# Patient Record
Sex: FEMALE | Race: WHITE | NOT HISPANIC OR LATINO | Employment: UNEMPLOYED
[De-identification: names, ages, dates, MRNs, and addresses within clinical notes are randomized per-mention and may not be internally consistent; named-entity substitution may affect disease eponyms.]

---

## 2017-10-29 ENCOUNTER — HEALTH MAINTENANCE LETTER (OUTPATIENT)
Age: 37
End: 2017-10-29

## 2018-12-10 ENCOUNTER — HOSPITAL ENCOUNTER (OUTPATIENT)
Dept: MAMMOGRAPHY | Facility: CLINIC | Age: 38
Discharge: HOME | End: 2018-12-10
Attending: INTERNAL MEDICINE | Admitting: INTERNAL MEDICINE
Payer: COMMERCIAL

## 2018-12-10 DIAGNOSIS — Z12.31 VISIT FOR SCREENING MAMMOGRAM: ICD-10-CM

## 2018-12-10 PROCEDURE — 77063 BREAST TOMOSYNTHESIS BI: CPT

## 2019-12-10 ENCOUNTER — HOSPITAL ENCOUNTER (OUTPATIENT)
Dept: MAMMOGRAPHY | Facility: CLINIC | Age: 39
Discharge: HOME | End: 2019-12-10
Attending: INTERNAL MEDICINE | Admitting: INTERNAL MEDICINE
Payer: COMMERCIAL

## 2019-12-10 DIAGNOSIS — Z12.31 SCREENING MAMMOGRAM, ENCOUNTER FOR: ICD-10-CM

## 2019-12-10 PROCEDURE — 77063 BREAST TOMOSYNTHESIS BI: CPT

## 2020-12-29 ENCOUNTER — TRANSFERRED RECORDS (OUTPATIENT)
Dept: HEALTH INFORMATION MANAGEMENT | Facility: CLINIC | Age: 40
End: 2020-12-29

## 2020-12-31 ENCOUNTER — TRANSFERRED RECORDS (OUTPATIENT)
Dept: HEALTH INFORMATION MANAGEMENT | Facility: CLINIC | Age: 40
End: 2020-12-31

## 2021-01-04 ENCOUNTER — TRANSFERRED RECORDS (OUTPATIENT)
Dept: HEALTH INFORMATION MANAGEMENT | Facility: CLINIC | Age: 41
End: 2021-01-04

## 2021-01-29 ENCOUNTER — HOSPITAL ENCOUNTER (OUTPATIENT)
Dept: MAMMOGRAPHY | Facility: CLINIC | Age: 41
Discharge: HOME | End: 2021-01-29
Attending: INTERNAL MEDICINE | Admitting: INTERNAL MEDICINE
Payer: COMMERCIAL

## 2021-01-29 DIAGNOSIS — Z12.31 VISIT FOR SCREENING MAMMOGRAM: ICD-10-CM

## 2021-01-29 PROCEDURE — 77063 BREAST TOMOSYNTHESIS BI: CPT

## 2021-02-16 ENCOUNTER — OFFICE VISIT (OUTPATIENT)
Dept: CARDIOLOGY | Facility: CLINIC | Age: 41
End: 2021-02-16
Payer: COMMERCIAL

## 2021-02-16 VITALS
HEIGHT: 64 IN | SYSTOLIC BLOOD PRESSURE: 119 MMHG | BODY MASS INDEX: 24.92 KG/M2 | DIASTOLIC BLOOD PRESSURE: 80 MMHG | WEIGHT: 146 LBS | HEART RATE: 70 BPM

## 2021-02-16 DIAGNOSIS — R00.2 PALPITATIONS: Primary | ICD-10-CM

## 2021-02-16 PROCEDURE — 99203 OFFICE O/P NEW LOW 30 MIN: CPT | Performed by: INTERNAL MEDICINE

## 2021-02-16 PROCEDURE — 93000 ELECTROCARDIOGRAM COMPLETE: CPT | Performed by: INTERNAL MEDICINE

## 2021-02-16 RX ORDER — METOPROLOL TARTRATE 25 MG/1
TABLET, FILM COATED ORAL
Qty: 6 TABLET | Refills: 1 | Status: SHIPPED | OUTPATIENT
Start: 2021-02-16 | End: 2022-03-29

## 2021-02-16 ASSESSMENT — MIFFLIN-ST. JEOR: SCORE: 1302.25

## 2021-02-16 NOTE — LETTER
2/16/2021    Beverley Vanegas  [address] [facility] [address]    RE: Jyothi Landeros       Dear Colleague,    I had the pleasure of seeing Jyothi Landeros in the [facility].    HPI and Plan:   See dictation    Orders Placed This Encounter   Procedures     Follow-Up with Cardiologist     EKG 12-lead complete w/read - Clinics (performed today)     Holter Monitor 48 hour Adult Pediatric     No orders of the defined types were placed in this encounter.    There are no discontinued medications.      Encounter Diagnosis   Name Primary?     Palpitations Yes       CURRENT MEDICATIONS:  Current Outpatient Medications   Medication Sig Dispense Refill     IUD'S IU by Intrauterine route.         ALLERGIES   No Known Allergies    PAST MEDICAL HISTORY:  Past Medical History:   Diagnosis Date     Cardiac arrhythmia, unspecified cardiac arrhythmia type     PVC, rare PAC, AVnode wenckebache(mobitz type 1)       PAST SURGICAL HISTORY:  Past Surgical History:   Procedure Laterality Date     CONTRACEPT IUD         FAMILY HISTORY:  Family History   Problem Relation Age of Onset     Heart Disease Maternal Grandmother      Colon Polyps Mother         hyper PTH     Family History Negative No family hx of        SOCIAL HISTORY:  Social History     Socioeconomic History     Marital status:      Spouse name: None     Number of children: None     Years of education: None     Highest education level: None   Occupational History     None   Social Needs     Financial resource strain: None     Food insecurity     Worry: None     Inability: None     Transportation needs     Medical: None     Non-medical: None   Tobacco Use     Smoking status: Never Smoker     Smokeless tobacco: Never Used   Substance and Sexual Activity     Alcohol use: Yes     Comment: rarely     Drug use: No     Comment: caffeine 0,  some dark chocolate4     Sexual activity: Yes     Partners: Male   Lifestyle  "    Physical activity     Days per week: None     Minutes per session: None     Stress: None   Relationships     Social connections     Talks on phone: None     Gets together: None     Attends Restorationism service: None     Active member of club or organization: None     Attends meetings of clubs or organizations: None     Relationship status: None     Intimate partner violence     Fear of current or ex partner: None     Emotionally abused: None     Physically abused: None     Forced sexual activity: None   Other Topics Concern     Parent/sibling w/ CABG, MI or angioplasty before 65F 55M? Not Asked   Social History Narrative     None       Review of Systems:  Skin:  Negative     Eyes:  Positive for glasses;contacts  ENT:  Negative    Respiratory:  Negative    Cardiovascular:    palpitations;Positive for  Gastroenterology: Negative    Genitourinary:  Negative    Musculoskeletal:  Negative    Neurologic:  Positive for migraine headaches  Psychiatric:  Positive for sleep disturbances  Heme/Lymph/Imm:  Negative    Endocrine:  Negative      Physical Exam:  Vitals: /80   Pulse 70   Ht 1.626 m (5' 4\")   Wt 66.2 kg (146 lb)   BMI 25.06 kg/m      Constitutional:  cooperative, alert and oriented, well developed, well nourished, in no acute distress        Skin:  warm and dry to the touch, no apparent skin lesions or masses noted          Head:  normocephalic, no masses or lesions        Eyes:  pupils equal and round, conjunctivae and lids unremarkable, sclera white, no xanthalasma, EOMS intact, no nystagmus        Lymph:No Cervical lymphadenopathy present;No thyromegaly     ENT:  no pallor or cyanosis, dentition good        Neck:  carotid pulses are full and equal bilaterally, JVP normal, no carotid bruit        Respiratory:  normal breath sounds, clear to auscultation, normal A-P diameter, normal symmetry, normal respiratory excursion, no use of accessory muscles         Cardiac: regular rhythm, normal S1/S2, no S3 or " S4, apical impulse not displaced, no murmurs, gallops or rubs       systolic murmur        pulses full and equal, no bruits auscultated                                        GI:  abdomen soft, non-tender, BS normoactive, no mass, no HSM, no bruits        Extremities and Muscular Skeletal:  no deformities, clubbing, cyanosis, erythema observed;no edema              Neurological:  no gross motor deficits        Psych:  Alert and Oriented x 3      Recent Lab Results:  LIPID RESULTS:  No results found for: CHOL, HDL, LDL, TRIG, CHOLHDLRATIO    LIVER ENZYME RESULTS:  No results found for: AST, ALT    CBC RESULTS:  No results found for: WBC, RBC, HGB, HCT, MCV, MCH, MCHC, RDW, PLT    BMP RESULTS:  No results found for: NA, POTASSIUM, CHLORIDE, CO2, ANIONGAP, GLC, BUN, CR, GFRESTIMATED, GFRESTBLACK, MONAE     A1C RESULTS:  No results found for: A1C    INR RESULTS:  No results found for: INR        CC  No referring provider defined for this encounter.      Thank you for allowing me to participate in the care of your patient.      Sincerely,     Dre Vann MD     [facility]  cc:   No referring provider defined for this encounter.

## 2021-02-16 NOTE — PROGRESS NOTES
HPI and Plan:   See dictation    Orders Placed This Encounter   Procedures     Follow-Up with Cardiologist     EKG 12-lead complete w/read - Clinics (performed today)     Holter Monitor 48 hour Adult Pediatric     No orders of the defined types were placed in this encounter.    There are no discontinued medications.      Encounter Diagnosis   Name Primary?     Palpitations Yes       CURRENT MEDICATIONS:  Current Outpatient Medications   Medication Sig Dispense Refill     IUD'S IU by Intrauterine route.         ALLERGIES   No Known Allergies    PAST MEDICAL HISTORY:  Past Medical History:   Diagnosis Date     Cardiac arrhythmia, unspecified cardiac arrhythmia type     PVC, rare PAC, AVnode wenckebache(mobitz type 1)       PAST SURGICAL HISTORY:  Past Surgical History:   Procedure Laterality Date     CONTRACEPT IUD         FAMILY HISTORY:  Family History   Problem Relation Age of Onset     Heart Disease Maternal Grandmother      Colon Polyps Mother         hyper PTH     Family History Negative No family hx of        SOCIAL HISTORY:  Social History     Socioeconomic History     Marital status:      Spouse name: None     Number of children: None     Years of education: None     Highest education level: None   Occupational History     None   Social Needs     Financial resource strain: None     Food insecurity     Worry: None     Inability: None     Transportation needs     Medical: None     Non-medical: None   Tobacco Use     Smoking status: Never Smoker     Smokeless tobacco: Never Used   Substance and Sexual Activity     Alcohol use: Yes     Comment: rarely     Drug use: No     Comment: caffeine 0,  some dark chocolate4     Sexual activity: Yes     Partners: Male   Lifestyle     Physical activity     Days per week: None     Minutes per session: None     Stress: None   Relationships     Social connections     Talks on phone: None     Gets together: None     Attends Islam service: None     Active member of  "club or organization: None     Attends meetings of clubs or organizations: None     Relationship status: None     Intimate partner violence     Fear of current or ex partner: None     Emotionally abused: None     Physically abused: None     Forced sexual activity: None   Other Topics Concern     Parent/sibling w/ CABG, MI or angioplasty before 65F 55M? Not Asked   Social History Narrative     None       Review of Systems:  Skin:  Negative     Eyes:  Positive for glasses;contacts  ENT:  Negative    Respiratory:  Negative    Cardiovascular:    palpitations;Positive for  Gastroenterology: Negative    Genitourinary:  Negative    Musculoskeletal:  Negative    Neurologic:  Positive for migraine headaches  Psychiatric:  Positive for sleep disturbances  Heme/Lymph/Imm:  Negative    Endocrine:  Negative      Physical Exam:  Vitals: /80   Pulse 70   Ht 1.626 m (5' 4\")   Wt 66.2 kg (146 lb)   BMI 25.06 kg/m      Constitutional:  cooperative, alert and oriented, well developed, well nourished, in no acute distress        Skin:  warm and dry to the touch, no apparent skin lesions or masses noted          Head:  normocephalic, no masses or lesions        Eyes:  pupils equal and round, conjunctivae and lids unremarkable, sclera white, no xanthalasma, EOMS intact, no nystagmus        Lymph:No Cervical lymphadenopathy present;No thyromegaly     ENT:  no pallor or cyanosis, dentition good        Neck:  carotid pulses are full and equal bilaterally, JVP normal, no carotid bruit        Respiratory:  normal breath sounds, clear to auscultation, normal A-P diameter, normal symmetry, normal respiratory excursion, no use of accessory muscles         Cardiac: regular rhythm, normal S1/S2, no S3 or S4, apical impulse not displaced, no murmurs, gallops or rubs       systolic murmur        pulses full and equal, no bruits auscultated                                        GI:  abdomen soft, non-tender, BS normoactive, no mass, no " HSM, no bruits        Extremities and Muscular Skeletal:  no deformities, clubbing, cyanosis, erythema observed;no edema              Neurological:  no gross motor deficits        Psych:  Alert and Oriented x 3      Recent Lab Results:  LIPID RESULTS:  No results found for: CHOL, HDL, LDL, TRIG, CHOLHDLRATIO    LIVER ENZYME RESULTS:  No results found for: AST, ALT    CBC RESULTS:  No results found for: WBC, RBC, HGB, HCT, MCV, MCH, MCHC, RDW, PLT    BMP RESULTS:  No results found for: NA, POTASSIUM, CHLORIDE, CO2, ANIONGAP, GLC, BUN, CR, GFRESTIMATED, GFRESTBLACK, MONAE     A1C RESULTS:  No results found for: A1C    INR RESULTS:  No results found for: INR        CC  No referring provider defined for this encounter.

## 2021-02-16 NOTE — LETTER
2/16/2021      Beverley Vanegas  [address] [facility] [address]      RE: Jyothi Landeros       Dear Colleague,    I had the pleasure of seeing Jyothi Landeros in the [facility].    Service Date: 02/16/2021      Jyothi Landeros is a delightful, very fit 41-year-old woman.  She was referred in actually by my cousin, Isabella, and Dr. Vanegas for palpitations.  I reviewed the workup Dr. Vanegas did and I want to thank her in advance.  She really did a very complete workup.  The patient is having isolated flip flops, usually at rest in a quiet environment, not with exercise.  She has no exercise intolerance, no chest pain, no dizziness, no syncope.  The patient has a Zio Patch that shows PACs rarely, PVCs which she was symptomatic and also Mobitz I type, usually with low heart rates and sometimes at night.  The patient's echocardiogram showed mild MR.  Otherwise, it was a completely normal echo.  Electrolytes, TSH, etc., are all normal.      Today's visit was an hour.  We were very complete.  We discussed all of this, PACs, PVCs.  We explained the heart's electrical system.  She is already off caffeine, but she did not realize that dark chocolate has theobromine which is a caffeine-type analog and so she will watch that.  I told her these are actually very benign and some people are bothered by them.  I told her we can give her a low-dose beta blocker on a p.r.n. basis.  I explained, though, we have to be careful since she runs a low heart rate and she had Wenckebach, but in case she was having a very bad day, we told her she could take 25 or 50 mg of Lopressor.  I reviewed all of her questions.  She had a number of very appropriate questions about what she can do or not do.  I really told her that there are no limitations here.  I will see her back in 1 year.  I plan on doing a 48-hour Holter to watch for heart block, etc.      Today's visit then was 1 hour,  greater than 50% counseling.  We will see the patient back in 1 year for a routine office visit.  She may need a repeat echo in 3-5 years to look at the mitral insufficiency.      cc:   Beverley Vanegas MD   [address]  [address]         DRE UAMNA MD             D: 2021   T: 2021   MT: billy      Name:     AIDEN MAHARAJ   MRN:      [id number]        Account:      [id number]   :      1980           Service Date: 2021      Document: [id number]        Outpatient Encounter Medications as of 2021   Medication Sig Dispense Refill     metoprolol tartrate (LOPRESSOR) 25 MG tablet 1 or 2 tabs prn palpitations 6 tablet 1     IUD'S IU by Intrauterine route.       No facility-administered encounter medications on file as of 2021.        Again, thank you for allowing me to participate in the care of your patient.      Sincerely,    Dre Umana MD     [facility]

## 2021-02-16 NOTE — PROGRESS NOTES
Service Date: 02/16/2021      Jyothi Landeros is a delightful, very fit 41-year-old woman.  She was referred in actually by my cousin, Isabella, and Dr. Vanegas for palpitations.  I reviewed the workup Dr. Vanegas did and I want to thank her in advance.  She really did a very complete workup.  The patient is having isolated flip flops, usually at rest in a quiet environment, not with exercise.  She has no exercise intolerance, no chest pain, no dizziness, no syncope.  The patient has a Zio Patch that shows PACs rarely, PVCs which she was symptomatic and also Mobitz I type, usually with low heart rates and sometimes at night.  The patient's echocardiogram showed mild MR.  Otherwise, it was a completely normal echo.  Electrolytes, TSH, etc., are all normal.      Today's visit was an hour.  We were very complete.  We discussed all of this, PACs, PVCs.  We explained the heart's electrical system.  She is already off caffeine, but she did not realize that dark chocolate has theobromine which is a caffeine-type analog and so she will watch that.  I told her these are actually very benign and some people are bothered by them.  I told her we can give her a low-dose beta blocker on a p.r.n. basis.  I explained, though, we have to be careful since she runs a low heart rate and she had Wenckebach, but in case she was having a very bad day, we told her she could take 25 or 50 mg of Lopressor.  I reviewed all of her questions.  She had a number of very appropriate questions about what she can do or not do.  I really told her that there are no limitations here.  I will see her back in 1 year.  I plan on doing a 48-hour Holter to watch for heart block, etc.      Today's visit then was 1 hour, greater than 50% counseling.  We will see the patient back in 1 year for a routine office visit.  She may need a repeat echo in 3-5 years to look at the mitral insufficiency.      cc:   Beverley Vanegas MD   [facility]   [address]  [place]  [address]         CAROLYN UMANA MD             D: 2021   T: 2021   MT: billy      Name:     AIDEN MAHARAJ   MRN:      [id number]        Account:      [id number]   :      1980           Service Date: 2021      Document: [id number]

## 2021-05-28 ENCOUNTER — APPOINTMENT (OUTPATIENT)
Dept: URBAN - METROPOLITAN AREA CLINIC 256 | Age: 41
Setting detail: DERMATOLOGY
End: 2021-05-28

## 2021-06-18 ENCOUNTER — APPOINTMENT (OUTPATIENT)
Dept: URBAN - METROPOLITAN AREA CLINIC 256 | Age: 41
Setting detail: DERMATOLOGY
End: 2021-06-18

## 2021-08-07 ENCOUNTER — HEALTH MAINTENANCE LETTER (OUTPATIENT)
Age: 41
End: 2021-08-07

## 2021-10-02 ENCOUNTER — HEALTH MAINTENANCE LETTER (OUTPATIENT)
Age: 41
End: 2021-10-02

## 2022-02-03 ENCOUNTER — HOSPITAL ENCOUNTER (OUTPATIENT)
Dept: MAMMOGRAPHY | Facility: CLINIC | Age: 42
Discharge: HOME | End: 2022-02-03
Attending: INTERNAL MEDICINE | Admitting: INTERNAL MEDICINE
Payer: COMMERCIAL

## 2022-02-03 DIAGNOSIS — Z12.31 VISIT FOR SCREENING MAMMOGRAM: ICD-10-CM

## 2022-02-03 PROCEDURE — 77067 SCR MAMMO BI INCL CAD: CPT

## 2022-03-21 ENCOUNTER — HOSPITAL ENCOUNTER (OUTPATIENT)
Dept: CARDIOLOGY | Facility: CLINIC | Age: 42
Discharge: HOME | End: 2022-03-21
Attending: INTERNAL MEDICINE | Admitting: INTERNAL MEDICINE
Payer: COMMERCIAL

## 2022-03-21 DIAGNOSIS — R00.2 PALPITATIONS: ICD-10-CM

## 2022-03-21 PROCEDURE — 93227 XTRNL ECG REC<48 HR R&I: CPT | Performed by: INTERNAL MEDICINE

## 2022-03-21 PROCEDURE — 93225 XTRNL ECG REC<48 HRS REC: CPT

## 2022-03-29 ENCOUNTER — OFFICE VISIT (OUTPATIENT)
Dept: CARDIOLOGY | Facility: CLINIC | Age: 42
End: 2022-03-29
Payer: COMMERCIAL

## 2022-03-29 VITALS
HEIGHT: 64 IN | DIASTOLIC BLOOD PRESSURE: 68 MMHG | HEART RATE: 76 BPM | OXYGEN SATURATION: 98 % | SYSTOLIC BLOOD PRESSURE: 100 MMHG | WEIGHT: 148 LBS | BODY MASS INDEX: 25.27 KG/M2

## 2022-03-29 DIAGNOSIS — I49.8 OTHER CARDIAC ARRHYTHMIA: Primary | ICD-10-CM

## 2022-03-29 PROCEDURE — 99213 OFFICE O/P EST LOW 20 MIN: CPT | Performed by: INTERNAL MEDICINE

## 2022-03-29 RX ORDER — MAGNESIUM OXIDE/MAG AA CHELATE 300 MG
300 CAPSULE ORAL DAILY PRN
COMMUNITY
Start: 2021-12-13

## 2022-03-29 NOTE — PROGRESS NOTES
HPI and Plan:   See dictation    No orders of the defined types were placed in this encounter.    Orders Placed This Encounter   Medications     cholecalciferol 50 MCG (2000 UT) CAPS     Sig: Take 2,000 Units by mouth daily     Magnesium 300 MG CAPS     Sig: Take 300 mg by mouth daily as needed     Medications Discontinued During This Encounter   Medication Reason     metoprolol tartrate (LOPRESSOR) 25 MG tablet Therapy completed         No diagnosis found.    CURRENT MEDICATIONS:  Current Outpatient Medications   Medication Sig Dispense Refill     cholecalciferol 50 MCG (2000 UT) CAPS Take 2,000 Units by mouth daily       IUD'S IU by Intrauterine route.       Magnesium 300 MG CAPS Take 300 mg by mouth daily as needed         ALLERGIES   No Known Allergies    PAST MEDICAL HISTORY:  Past Medical History:   Diagnosis Date     Cardiac arrhythmia, unspecified cardiac arrhythmia type     PVC, rare PAC, AVnode wenckebache(mobitz type 1)     Labial tear     R hip       PAST SURGICAL HISTORY:  Past Surgical History:   Procedure Laterality Date     CONTRACEPT IUD         FAMILY HISTORY:  Family History   Problem Relation Age of Onset     Heart Disease Maternal Grandmother      Colon Polyps Mother         hyper PTH     Family History Negative No family hx of        SOCIAL HISTORY:  Social History     Socioeconomic History     Marital status:      Spouse name: None     Number of children: None     Years of education: None     Highest education level: None   Occupational History     None   Tobacco Use     Smoking status: Never Smoker     Smokeless tobacco: Never Used   Substance and Sexual Activity     Alcohol use: Yes     Comment: rarely     Drug use: No     Comment: caffeine 0,  some dark chocolate4     Sexual activity: Yes     Partners: Male   Other Topics Concern     Parent/sibling w/ CABG, MI or angioplasty before 65F 55M? Not Asked   Social History Narrative     None     Social Determinants of Health     Financial  "Resource Strain: Not on file   Food Insecurity: Not on file   Transportation Needs: Not on file   Physical Activity: Not on file   Stress: Not on file   Social Connections: Not on file   Intimate Partner Violence: Not on file   Housing Stability: Not on file       Review of Systems:  Skin:  Negative     Eyes:  Positive for glasses;contacts  ENT:  Negative    Respiratory:  Negative    Cardiovascular:    palpitations;Positive for  Gastroenterology: Negative    Genitourinary:  Negative    Musculoskeletal:  Negative    Neurologic:  Positive for migraine headaches  Psychiatric:  Positive for sleep disturbances  Heme/Lymph/Imm:  Negative    Endocrine:  Negative      Physical Exam:  Vitals: /68   Pulse 76   Ht 1.626 m (5' 4\")   Wt 67.1 kg (148 lb)   SpO2 98%   BMI 25.40 kg/m      Constitutional:           Skin:             Head:           Eyes:           Lymph:      ENT:           Neck:           Respiratory:            Cardiac:                                                           GI:           Extremities and Muscular Skeletal:                 Neurological:           Psych:         Recent Lab Results:  LIPID RESULTS:  No results found for: CHOL, HDL, LDL, TRIG, CHOLHDLRATIO    LIVER ENZYME RESULTS:  No results found for: AST, ALT    CBC RESULTS:  No results found for: WBC, RBC, HGB, HCT, MCV, MCH, MCHC, RDW, PLT    BMP RESULTS:  No results found for: NA, POTASSIUM, CHLORIDE, CO2, ANIONGAP, GLC, BUN, CR, GFRESTIMATED, GFRESTBLACK, MONAE     A1C RESULTS:  No results found for: A1C    INR RESULTS:  No results found for: INR        CC  Dre Vann MD  [address]  [place] [id number]  "

## 2022-03-29 NOTE — LETTER
3/29/2022    Radha Ventura  [address] [facility] [address]    RE: Jyothi Landeros       Dear Colleague,     I had the pleasure of seeing Jyothi Landeros in the [facility].  HPI and Plan:   See dictation    No orders of the defined types were placed in this encounter.    Orders Placed This Encounter   Medications     cholecalciferol 50 MCG (2000 UT) CAPS     Sig: Take 2,000 Units by mouth daily     Magnesium 300 MG CAPS     Sig: Take 300 mg by mouth daily as needed     Medications Discontinued During This Encounter   Medication Reason     metoprolol tartrate (LOPRESSOR) 25 MG tablet Therapy completed         No diagnosis found.    CURRENT MEDICATIONS:  Current Outpatient Medications   Medication Sig Dispense Refill     cholecalciferol 50 MCG (2000 UT) CAPS Take 2,000 Units by mouth daily       IUD'S IU by Intrauterine route.       Magnesium 300 MG CAPS Take 300 mg by mouth daily as needed         ALLERGIES   No Known Allergies    PAST MEDICAL HISTORY:  Past Medical History:   Diagnosis Date     Cardiac arrhythmia, unspecified cardiac arrhythmia type     PVC, rare PAC, AVnode wenckebache(mobitz type 1)     Labial tear     R hip       PAST SURGICAL HISTORY:  Past Surgical History:   Procedure Laterality Date     CONTRACEPT IUD         FAMILY HISTORY:  Family History   Problem Relation Age of Onset     Heart Disease Maternal Grandmother      Colon Polyps Mother         hyper PTH     Family History Negative No family hx of        SOCIAL HISTORY:  Social History     Socioeconomic History     Marital status:      Spouse name: None     Number of children: None     Years of education: None     Highest education level: None   Occupational History     None   Tobacco Use     Smoking status: Never Smoker     Smokeless tobacco: Never Used   Substance and Sexual Activity     Alcohol use: Yes     Comment: rarely     Drug use: No     Comment: caffeine 0,  some dark chocolate4     Sexual activity: Yes  "    Partners: Male   Other Topics Concern     Parent/sibling w/ CABG, MI or angioplasty before 65F 55M? Not Asked   Social History Narrative     None     Social Determinants of Health     Financial Resource Strain: Not on file   Food Insecurity: Not on file   Transportation Needs: Not on file   Physical Activity: Not on file   Stress: Not on file   Social Connections: Not on file   Intimate Partner Violence: Not on file   Housing Stability: Not on file       Review of Systems:  Skin:  Negative     Eyes:  Positive for glasses;contacts  ENT:  Negative    Respiratory:  Negative    Cardiovascular:    palpitations;Positive for  Gastroenterology: Negative    Genitourinary:  Negative    Musculoskeletal:  Negative    Neurologic:  Positive for migraine headaches  Psychiatric:  Positive for sleep disturbances  Heme/Lymph/Imm:  Negative    Endocrine:  Negative      Physical Exam:  Vitals: /68   Pulse 76   Ht 1.626 m (5' 4\")   Wt 67.1 kg (148 lb)   SpO2 98%   BMI 25.40 kg/m      Constitutional:           Skin:             Head:           Eyes:           Lymph:      ENT:           Neck:           Respiratory:            Cardiac:                                                           GI:           Extremities and Muscular Skeletal:                 Neurological:           Psych:         Recent Lab Results:  LIPID RESULTS:  No results found for: CHOL, HDL, LDL, TRIG, CHOLHDLRATIO    LIVER ENZYME RESULTS:  No results found for: AST, ALT    CBC RESULTS:  No results found for: WBC, RBC, HGB, HCT, MCV, MCH, MCHC, RDW, PLT    BMP RESULTS:  No results found for: NA, POTASSIUM, CHLORIDE, CO2, ANIONGAP, GLC, BUN, CR, GFRESTIMATED, GFRESTBLACK, MONAE     A1C RESULTS:  No results found for: A1C    INR RESULTS:  No results found for: INR    CC  Dre Vann MD  [address] [facility] [address]  [place] [address]    Thank you for allowing me to participate in the care of your patient.      Sincerely,     Dre Vann MD     M " [facility]

## 2022-03-29 NOTE — PROGRESS NOTES
Service Date: 2022    Jyothi Maharaj returns for followup.  She is a delightful 42-year-old woman.  She is also actually a friend of my cousin Isabella.  She was referred in for palpitations last year.  Last year on a Zio Patch she had mostly PVCs, some PACs, some episodes of Wenckebach, but not necessarily when she was sleeping.  I gave her beta blocker to take on a p.r.n. basis for bad days with palpitations, but I assured her this was a benign issue.  She had an echocardiogram, which was unremarkable and her 12-lead EKG was unremarkable.  She wore a heart monitor this year.  It turns out she again has mostly unifocal PVCs, rare PACs, no Wenckebach at this time to speak of.  The patient has never taken the beta-blocker medicine.  At this time, I talked to her about considering magnesium supplement if anything, continue to take the beta blocker only if she is having a bad day, but nothing specifically needs to be treated.  She actually talked more about her  who sounds like he had a renal artery embolism and she asked questions about that.  The only suggestion I might have is perhaps in 3 years to perhaps do an echocardiogram again since her echo from late  showed mild mitral insufficiency.  They did not report any mitral valve leaflet problems.  No prolapse, etc.  I have not scheduled further followup appointment.    Total consultation time was 24 minutes.    Dre Vann MD    cc:  Radha Vent[place] [address]    Dre Vann MD        D: 2022   T: 2022   MT: [id number]    Name:     JYOTHI MAHARAJ  MRN:      [id number]        Account:      [id number]   :      1980           Service Date: 2022       Document: [id number]

## 2022-08-28 ENCOUNTER — HEALTH MAINTENANCE LETTER (OUTPATIENT)
Age: 42
End: 2022-08-28

## 2023-01-12 ENCOUNTER — HOSPITAL ENCOUNTER (OUTPATIENT)
Dept: MAMMOGRAPHY | Facility: CLINIC | Age: 43
Discharge: HOME | End: 2023-01-12
Attending: INTERNAL MEDICINE | Admitting: INTERNAL MEDICINE
Payer: COMMERCIAL

## 2023-01-12 DIAGNOSIS — Z12.31 VISIT FOR SCREENING MAMMOGRAM: ICD-10-CM

## 2023-01-12 PROCEDURE — 77067 SCR MAMMO BI INCL CAD: CPT

## 2023-12-09 ENCOUNTER — HEALTH MAINTENANCE LETTER (OUTPATIENT)
Age: 43
End: 2023-12-09

## 2024-02-07 ENCOUNTER — HOSPITAL ENCOUNTER (OUTPATIENT)
Dept: MAMMOGRAPHY | Facility: CLINIC | Age: 44
Discharge: HOME | End: 2024-02-07
Attending: INTERNAL MEDICINE | Admitting: INTERNAL MEDICINE
Payer: COMMERCIAL

## 2024-02-07 DIAGNOSIS — Z12.31 VISIT FOR SCREENING MAMMOGRAM: ICD-10-CM

## 2024-02-07 PROCEDURE — 77063 BREAST TOMOSYNTHESIS BI: CPT

## 2024-11-23 ENCOUNTER — HEALTH MAINTENANCE LETTER (OUTPATIENT)
Age: 44
End: 2024-11-23